# Patient Record
Sex: FEMALE | Race: OTHER | HISPANIC OR LATINO | URBAN - NONMETROPOLITAN AREA
[De-identification: names, ages, dates, MRNs, and addresses within clinical notes are randomized per-mention and may not be internally consistent; named-entity substitution may affect disease eponyms.]

---

## 2021-07-26 ENCOUNTER — APPOINTMENT (RX ONLY)
Dept: URBAN - NONMETROPOLITAN AREA CLINIC 25 | Facility: CLINIC | Age: 36
Setting detail: DERMATOLOGY
End: 2021-07-26

## 2021-07-26 VITALS — TEMPERATURE: 97.9 F | WEIGHT: 250 LBS | HEIGHT: 64 IN

## 2021-07-26 DIAGNOSIS — G51.0 BELL'S PALSY: ICD-10-CM | Status: INADEQUATELY CONTROLLED

## 2021-07-26 PROCEDURE — ? TREATMENT REGIMEN

## 2021-07-26 PROCEDURE — ? COUNSELING

## 2021-07-26 PROCEDURE — 99202 OFFICE O/P NEW SF 15 MIN: CPT

## 2021-07-26 ASSESSMENT — LOCATION DETAILED DESCRIPTION DERM
LOCATION DETAILED: LEFT MEDIAL SUPERIOR EYELID
LOCATION DETAILED: LEFT UPPER CUTANEOUS LIP

## 2021-07-26 ASSESSMENT — LOCATION SIMPLE DESCRIPTION DERM
LOCATION SIMPLE: LEFT LIP
LOCATION SIMPLE: LEFT SUPERIOR EYELID

## 2021-07-26 ASSESSMENT — LOCATION ZONE DERM
LOCATION ZONE: EYELID
LOCATION ZONE: LIP

## 2021-07-26 NOTE — PROCEDURE: TREATMENT REGIMEN
Plan: Advised pt Botox can be used help with Bell’s palsy. Advised pt both sides of the face may need to be treated to even the face out, pt verbalized understanding. Advised pt treatment will be switched to Kiara Dumont for further treatment for Botox, pt verbalized understanding.
Detail Level: Detailed

## 2021-07-27 ENCOUNTER — APPOINTMENT (RX ONLY)
Dept: URBAN - NONMETROPOLITAN AREA CLINIC 25 | Facility: CLINIC | Age: 36
Setting detail: DERMATOLOGY
End: 2021-07-27

## 2021-07-27 VITALS — TEMPERATURE: 97.2 F

## 2021-07-27 DIAGNOSIS — Z41.9 ENCOUNTER FOR PROCEDURE FOR PURPOSES OTHER THAN REMEDYING HEALTH STATE, UNSPECIFIED: ICD-10-CM

## 2021-07-27 PROCEDURE — ? BOTOX

## 2021-07-27 PROCEDURE — ? FILLERS

## 2021-07-27 NOTE — PROCEDURE: BOTOX
Show Ucl Units: No
Additional Area 2 Units: 1
Left Pupillary Line Units: 0
Show Forehead Units: Yes
Lot #: [id number]
Consent: Written consent obtained. Risks include but not limited to lid/brow ptosis, bruising, swelling, diplopia, temporary effect, incomplete chemical denervation.
Dilution (U/0.1 Cc): 4
Post-Care Instructions: Patient instructed to not lie down for 4 hours and limit physical activity for 24 hours.
Expiration Date (Month Year): 10/2023
Additional Area 2 Location: Right vermillion border
Periorbital Skin Units: 11
Price (Use Numbers Only, No Special Characters Or $): 130
Detail Level: Detailed

## 2021-07-27 NOTE — PROCEDURE: FILLERS
Marionette Lines Filler  Volume In Cc: 0
Post-Care Instructions: Patient instructed to apply ice to reduce swelling.
Lot #: [id number]
Anesthesia Volume In Cc: 0.5
Expiration Date (Month Year): 11-
Include Cannula Information In Note?: No
Additional Area 1 Location: Lips
Detail Level: Detailed
Filler: Restylane Kysse
Price (Use Numbers Only, No Special Characters Or $): 014
Map Statment: See Attach Map for Complete Details
Additional Comments: patient s given restylane wilder lot#[id number] exp 11-
Consent: Written consent obtained. Risks include but not limited to bruising, beading, irregular texture, ulceration, infection, allergic reaction, scar formation, incomplete augmentation, temporary nature, procedural pain.
Vermilion Lips Filler Volume In Cc: 1

## 2021-10-08 ENCOUNTER — APPOINTMENT (RX ONLY)
Dept: URBAN - NONMETROPOLITAN AREA CLINIC 25 | Facility: CLINIC | Age: 36
Setting detail: DERMATOLOGY
End: 2021-10-08

## 2021-10-08 DIAGNOSIS — Z41.9 ENCOUNTER FOR PROCEDURE FOR PURPOSES OTHER THAN REMEDYING HEALTH STATE, UNSPECIFIED: ICD-10-CM

## 2021-10-08 PROCEDURE — ? ADDITIONAL NOTES

## 2021-10-08 PROCEDURE — ? BOTOX

## 2021-10-08 NOTE — PROCEDURE: BOTOX
Additional Area 3 Location: Left periorbital
Post-Care Instructions: Patient instructed to not lie down for 4 hours and limit physical activity for 24 hours.
Show Additional Area 3: Yes
Dilution (U/0.1 Cc): 4
Left Periorbital Units: 0
Show Right And Left Periorbital Units: No
Additional Area 1 Location: Right vermilion border
Additional Area 5 Units: 2
Additional Area 3 Units: 12
Reconstitution Date (Optional): 10/1/2021
Additional Area 2 Location: Right periorbital
Expiration Date (Month Year): 11/2023
Additional Area 4 Location: Right forehead
Lot #: [id number]
Additional Area 5 Location: Left forehead
Consent: Written consent obtained. Risks include but not limited to lid/brow ptosis, bruising, swelling, diplopia, temporary effect, incomplete chemical denervation.
Additional Area 4 Units: 6
Detail Level: Detailed

## 2021-10-08 NOTE — PROCEDURE: ADDITIONAL NOTES
Detail Level: Simple
Render Risk Assessment In Note?: no
Additional Notes: Patient has [place] Palsy - see exact unit amounts and locations for Botox

## 2022-01-14 ENCOUNTER — APPOINTMENT (RX ONLY)
Dept: URBAN - NONMETROPOLITAN AREA CLINIC 25 | Facility: CLINIC | Age: 37
Setting detail: DERMATOLOGY
End: 2022-01-14

## 2022-01-14 DIAGNOSIS — Z41.9 ENCOUNTER FOR PROCEDURE FOR PURPOSES OTHER THAN REMEDYING HEALTH STATE, UNSPECIFIED: ICD-10-CM

## 2022-01-14 PROCEDURE — ? BOTOX

## 2022-01-14 PROCEDURE — ? FILLERS

## 2022-01-14 NOTE — PROCEDURE: BOTOX
Show Ucl Units: No
Consent: Written consent obtained. Risks include but not limited to lid/brow ptosis, bruising, swelling, diplopia, temporary effect, incomplete chemical denervation.
Glabellar Complex Units: 0
Show Orbicularis Oculi Units: Yes
Periorbital Skin Units: 20
Detail Level: Detailed
Post-Care Instructions: Patient instructed to not lie down for 4 hours and limit physical activity for 24 hours.
Lot #: [id number]
Forehead Units: 14
Expiration Date (Month Year): 10/23
Dilution (U/0.1 Cc): 4
Additional Area 1 Location: Depressor anguli oris

## 2022-01-14 NOTE — PROCEDURE: FILLERS
Additional Area 4 Volume In Cc: 0
Expiration Date (Month Year): 2/26/24
Anesthesia Volume In Cc: 0.5
Include Cannula Information In Note?: No
Expiration Date (Month Year): 1/31/23
Lot #: [id number]
Post-Care Instructions: Patient instructed to apply ice to reduce swelling.
Consent: Written consent obtained. Risks include but not limited to bruising, beading, irregular texture, ulceration, infection, allergic reaction, scar formation, incomplete augmentation, temporary nature, procedural pain.
Lot #: [id number]
Map Statment: See Attach Map for Complete Details
Filler: RHA 3
Detail Level: Detailed
Additional Anesthesia Volume In Cc: 6
Vermilion Lips Filler Volume In Cc: 1
Lot #: [id number]
Map Statment: See Attach Map for Complete Details
Expiration Date (Month Year): 11/2022
Lot #: [id number]
Filler: Restylane Kysse

## 2022-04-08 ENCOUNTER — APPOINTMENT (RX ONLY)
Dept: URBAN - NONMETROPOLITAN AREA CLINIC 25 | Facility: CLINIC | Age: 37
Setting detail: DERMATOLOGY
End: 2022-04-08

## 2022-04-08 DIAGNOSIS — Z41.9 ENCOUNTER FOR PROCEDURE FOR PURPOSES OTHER THAN REMEDYING HEALTH STATE, UNSPECIFIED: ICD-10-CM

## 2022-04-08 PROCEDURE — ? DYSPORT

## 2022-04-08 PROCEDURE — ? FILLERS

## 2022-04-08 PROCEDURE — ? BOTOX

## 2022-04-08 NOTE — PROCEDURE: DYSPORT
Left Periorbital Units: 0
Forehead Units: 15
Show Additional Area 4: Yes
Show Mentalis Units: No
Dilution (U/ 0.1cc): 10
Post-Care Instructions: Patient instructed to not lie down for 4 hours and limit physical activity for 24 hours.
Additional Area 1 Location: [place] border
Detail Level: Detailed
Periorbital Skin Units: 20
Consent: Written consent obtained. Risks include but not limited to lid/brow ptosis, bruising, swelling, diplopia, temporary effect, incomplete chemical denervation.
Lot #: [id number]
Expiration Date (Month Year): 8/31/2022

## 2022-04-08 NOTE — PROCEDURE: FILLERS
Use Map Statement For Sites (Optional): No
Dorsal Hands Filler  Volume In Cc: 0
Expiration Date (Month Year): 06/24
Additional Area 1 Location: Chin
Map Statment: See Attach Map for Complete Details
Filler: Restylane Kysse
Consent: Written consent obtained. Risks include but not limited to bruising, beading, irregular texture, ulceration, infection, allergic reaction, scar formation, incomplete augmentation, temporary nature, procedural pain.
Post-Care Instructions: Patient instructed to apply ice to reduce swelling.
Vermilion Lips Filler Volume In Cc: 1
Detail Level: Detailed
Lot #: [id number]
Anesthesia Volume In Cc: 0.5
Expiration Date (Month Year): 06/2023
Lot #: [id number]
Additional Anesthesia Volume In Cc: 6

## 2022-04-08 NOTE — PROCEDURE: BOTOX
Right Periorbital Units: 0
Show Additional Area 3: Yes
Show Right And Left Pupillary Line Units: No
Additional Area 2 Location: Right lower vermillion border
Consent: Written consent obtained. Risks include but not limited to lid/brow ptosis, bruising, swelling, diplopia, temporary effect, incomplete chemical denervation.
Lot #: [id number]
Expiration Date (Month Year): 10/23
Additional Area 2 Units: 2
Additional Area 1 Location: Right upper [place] border
Post-Care Instructions: Patient instructed to not lie down for 4 hours and limit physical activity for 24 hours.
Detail Level: Detailed
Dilution (U/0.1 Cc): 4
Additional Area 1 Units: 3
Additional Area 5 Location: [place]

## 2022-07-08 ENCOUNTER — APPOINTMENT (RX ONLY)
Dept: URBAN - METROPOLITAN AREA CLINIC 154 | Facility: CLINIC | Age: 37
Setting detail: DERMATOLOGY
End: 2022-07-08

## 2022-07-08 DIAGNOSIS — Z41.9 ENCOUNTER FOR PROCEDURE FOR PURPOSES OTHER THAN REMEDYING HEALTH STATE, UNSPECIFIED: ICD-10-CM

## 2022-07-08 PROCEDURE — ? FILLERS

## 2022-07-08 PROCEDURE — ? BOTOX

## 2022-07-08 NOTE — PROCEDURE: BOTOX
Show Additional Area 1: Yes
Show Right And Left Pupillary Line Units: No
Left Periorbital Units: 0
Expiration Date (Month Year): 5/24
Lot #: [id number]
Forehead Units: 5
Additional Area 1 Location: Right upper [place] border
Glabellar Complex Units: 7.5
Dilution (U/0.1 Cc): 4
Detail Level: Detailed
Additional Area 2 Location: Right lower vermillion border
Consent: Written consent obtained. Risks include but not limited to lid/brow ptosis, bruising, swelling, diplopia, temporary effect, incomplete chemical denervation.
Additional Area 5 Location: [place]
Post-Care Instructions: Patient instructed to not lie down for 4 hours and limit physical activity for 24 hours.
Periorbital Skin Units: 12.5

## 2022-07-08 NOTE — PROCEDURE: FILLERS
Vermilion Lips Filler Volume In Cc: 1
Nasolabial Folds Filler Volume In Cc: 0
Aspiration Statement: Aspiration was performed prior to injecting site with filler.
Include Cannula Information In Note?: No
Lot #: [id number]
Lot #: [id number]
Detail Level: Detailed
Lot #: [id number]
Expiration Date (Month Year): 10/31/2023
Anesthesia Volume In Cc: 0.5
Expiration Date (Month Year): 10/2024
Post-Care Instructions: Patient instructed to apply ice to reduce swelling.
Consent: Written consent obtained. Risks include but not limited to bruising, beading, irregular texture, ulceration, infection, allergic reaction, scar formation, incomplete augmentation, temporary nature, procedural pain.
Expiration Date (Month Year): 02/2023
Additional Anesthesia Volume In Cc: 6
Additional Area 1 Location: Chin
Map Statment: See Attach Map for Complete Details
Filler: Restylane Kysse

## 2022-10-07 ENCOUNTER — APPOINTMENT (RX ONLY)
Dept: URBAN - NONMETROPOLITAN AREA CLINIC 25 | Facility: CLINIC | Age: 37
Setting detail: DERMATOLOGY
End: 2022-10-07

## 2022-10-07 DIAGNOSIS — Z41.9 ENCOUNTER FOR PROCEDURE FOR PURPOSES OTHER THAN REMEDYING HEALTH STATE, UNSPECIFIED: ICD-10-CM

## 2022-10-07 PROCEDURE — ? FILLERS

## 2022-10-07 PROCEDURE — ? BOTOX

## 2022-10-07 NOTE — PROCEDURE: FILLERS
Tear Troughs Filler  Volume In Cc: 0
Additional Anesthesia Volume In Cc: 6
Additional Area 1 Location: Chin
Detail Level: Detailed
Lot #: [id number]
Filler: Restylane Kysse
Use Map Statement For Sites (Optional): No
Expiration Date (Month Year): 02/2023
Consent: Written consent obtained. Risks include but not limited to bruising, beading, irregular texture, ulceration, infection, allergic reaction, scar formation, incomplete augmentation, temporary nature, procedural pain.
Map Statment: See Attach Map for Complete Details
Post-Care Instructions: Patient instructed to apply ice to reduce swelling.
Lot #: [id number]
Aspiration Statement: Aspiration was performed prior to injecting site with filler.
Vermilion Lips Filler Volume In Cc: 1
Expiration Date (Month Year): 07/31/2023
Lot #: [id number]
Anesthesia Volume In Cc: 0.5
Expiration Date (Month Year): 10/2024

## 2022-10-07 NOTE — PROCEDURE: BOTOX
Additional Area 3 Units: 0
Show Forehead Units: Yes
Show Right And Left Pupillary Line Units: No
Patient Specific Comments (Will Not Stick From Patient To Patient): Injected across frontalis, more units used to left orbicularis oris than right
Expiration Date (Month Year): 5/24
Additional Area 5 Location: [place]
Consent: Written consent obtained. Risks include but not limited to lid/brow ptosis, bruising, swelling, diplopia, temporary effect, incomplete chemical denervation.
Forehead Units: 5
Additional Area 1 Location: Right upper vermillion
Post-Care Instructions: Patient instructed to not lie down for 4 hours and limit physical activity for 24 hours.
Inferior Lateral Orbicularis Oculi Units: 10
Lot #: [id number]
Detail Level: Detailed
Additional Area 2 Location: Right lower vermillion border
Dilution (U/0.1 Cc): 4

## 2022-10-20 ENCOUNTER — APPOINTMENT (RX ONLY)
Dept: URBAN - METROPOLITAN AREA CLINIC 154 | Facility: CLINIC | Age: 37
Setting detail: DERMATOLOGY
End: 2022-10-20

## 2022-10-20 DIAGNOSIS — Z41.9 ENCOUNTER FOR PROCEDURE FOR PURPOSES OTHER THAN REMEDYING HEALTH STATE, UNSPECIFIED: ICD-10-CM

## 2022-10-20 PROCEDURE — ? COSMETIC CONSULTATION: SKIN CARE PRODUCTS AND SERVICES

## 2023-01-04 ENCOUNTER — APPOINTMENT (RX ONLY)
Dept: URBAN - NONMETROPOLITAN AREA CLINIC 25 | Facility: CLINIC | Age: 38
Setting detail: DERMATOLOGY
End: 2023-01-04

## 2023-01-04 DIAGNOSIS — Z41.9 ENCOUNTER FOR PROCEDURE FOR PURPOSES OTHER THAN REMEDYING HEALTH STATE, UNSPECIFIED: ICD-10-CM

## 2023-01-04 PROCEDURE — ? JEUVEAU

## 2023-01-04 NOTE — PROCEDURE: JEUVEAU
Right Pupillary Line Units: 0
Show Lateral Platysmal Band Units: Yes
Additional Area 1 Units: 5
Additional Area 2 Location: [place]
Show Ucl Units: No
Lot #: [id number]
Detail Level: Detailed
Periorbital Skin Units: 15
Dilution (U/0.1 Cc): 4
Expiration Date (Month Year): 11/2024
Consent: Written consent obtained. Risks include but not limited to lid/brow ptosis, bruising, swelling, diplopia, temporary effect, incomplete chemical denervation.
Post-Care Instructions: Patient instructed to not lie down for 4 hours and limit physical activity for 24 hours.
Additional Area 1 Location: vermillion Border

## 2023-02-20 ENCOUNTER — APPOINTMENT (RX ONLY)
Dept: URBAN - METROPOLITAN AREA CLINIC 154 | Facility: CLINIC | Age: 38
Setting detail: DERMATOLOGY
End: 2023-02-20

## 2023-02-20 DIAGNOSIS — Z41.9 ENCOUNTER FOR PROCEDURE FOR PURPOSES OTHER THAN REMEDYING HEALTH STATE, UNSPECIFIED: ICD-10-CM

## 2023-02-20 PROCEDURE — ? ADDITIONAL NOTES

## 2023-02-20 PROCEDURE — ? DAXXIFY

## 2023-02-20 PROCEDURE — ? DYSPORT

## 2023-02-20 PROCEDURE — ? FILLERS

## 2023-02-20 NOTE — PROCEDURE: DAXXIFY
Show Lcl Units: No
R Brow Units: 0
Show Additional Area 4: Yes
Detail Level: Detailed
Additional Area 1 Units: 10
Dilution (U/0.1 Cc): 4
Consent: Written consent obtained. Risks include but not limited to lid/brow ptosis, bruising, swelling, diplopia, temporary effect, incomplete chemical denervation.
Post-Care Instructions: Patient instructed to not lie down for 4 hours and limit physical activity for 24 hours.
Additional Area 1 Location: [place] border to right border

## 2023-02-20 NOTE — PROCEDURE: DYSPORT
Show Forehead Units: Yes
Lcl Root Units: 0
Additional Area 1 Location: [place] border
Forehead Units: 7.5
Glabellar Complex Units: 15
Detail Level: Detailed
Consent: Written consent obtained. Risks include but not limited to lid/brow ptosis, bruising, swelling, diplopia, temporary effect, incomplete chemical denervation.
Show Mentalis Units: No
Additional Area 2 Location: chin
Dilution (U/ 0.1cc): 10
Post-Care Instructions: Patient instructed to not lie down for 4 hours and limit physical activity for 24 hours.
Periorbital Skin Units: 12.5
Lot #: [id number]
Expiration Date (Month Year): 01/2023

## 2023-02-20 NOTE — PROCEDURE: FILLERS
Additional Area 2 Volume In Cc: 0
Include Cannula Information In Note?: No
Consent: Written consent obtained. Risks include but not limited to bruising, beading, irregular texture, ulceration, infection, allergic reaction, scar formation, incomplete augmentation, temporary nature, procedural pain.
Filler: Restylane Kysse
Vermilion Lips Filler Volume In Cc: 1
Aspiration Statement: Aspiration was performed prior to injecting site with filler.
Lot #: [id number]
Expiration Date (Month Year): 02/2024
Detail Level: Detailed
Lot #: [id number]
Additional Area 1 Location: Chin
Anesthesia Volume In Cc: 0.5
Expiration Date (Month Year): 10/2024
Additional Anesthesia Volume In Cc: 6
Map Statment: See Attach Map for Complete Details
Lot #: [id number]
Post-Care Instructions: Patient instructed to apply ice to reduce swelling.
Expiration Date (Month Year): 02/2023

## 2023-02-20 NOTE — PROCEDURE: ADDITIONAL NOTES
Render Risk Assessment In Note?: no
Detail Level: Simple
Additional Notes: Only left crow’s feet injected

## 2023-07-03 ENCOUNTER — APPOINTMENT (RX ONLY)
Dept: URBAN - METROPOLITAN AREA CLINIC 154 | Facility: CLINIC | Age: 38
Setting detail: DERMATOLOGY
End: 2023-07-03

## 2023-07-03 DIAGNOSIS — Z41.9 ENCOUNTER FOR PROCEDURE FOR PURPOSES OTHER THAN REMEDYING HEALTH STATE, UNSPECIFIED: ICD-10-CM

## 2023-07-03 PROCEDURE — ? DYSPORT

## 2023-07-03 PROCEDURE — ? DAXXIFY

## 2023-10-09 ENCOUNTER — APPOINTMENT (RX ONLY)
Dept: URBAN - METROPOLITAN AREA CLINIC 154 | Facility: CLINIC | Age: 38
Setting detail: DERMATOLOGY
End: 2023-10-09

## 2023-10-09 DIAGNOSIS — Z41.9 ENCOUNTER FOR PROCEDURE FOR PURPOSES OTHER THAN REMEDYING HEALTH STATE, UNSPECIFIED: ICD-10-CM

## 2023-10-09 PROCEDURE — ? DYSPORT

## 2023-10-09 PROCEDURE — ? FILLERS

## 2023-10-09 PROCEDURE — ? DAXXIFY

## 2023-10-09 NOTE — PROCEDURE: FILLERS
Anesthesia Volume In Cc: 0.5
Mid Face Filler  Volume In Cc: 0
Post-Care Instructions: Patient instructed to apply ice to reduce swelling.
Expiration Date (Month Year): 02/2024
Detail Level: Detailed
Include Cannula Information In Note?: No
Additional Anesthesia Volume In Cc: 6
Map Statment: See Attach Map for Complete Details
Additional Area 1 Location: Chin
Lot #: [id number]
Filler: Restylane Kysse
Expiration Date (Month Year): 10/2024
Lot #: [id number]
Aspiration Statement: Aspiration was performed prior to injecting site with filler.
Expiration Date (Month Year): 02/2023
Vermilion Lips Filler Volume In Cc: 1
Consent: Written consent obtained. Risks include but not limited to bruising, beading, irregular texture, ulceration, infection, allergic reaction, scar formation, incomplete augmentation, temporary nature, procedural pain.
Lot #: [id number]

## 2023-10-09 NOTE — PROCEDURE: DAXXIFY
Show Depressor Anguli Units: Yes
Show Mentalis Units: No
[facility] Units: 0
Consent: Written consent obtained. Risks include but not limited to lid/brow ptosis, bruising, swelling, diplopia, temporary effect, incomplete chemical denervation.
Additional Area 1 Location: [place] border to right border
Detail Level: Detailed
Post-Care Instructions: Patient instructed to not lie down for 4 hours and limit physical activity for 24 hours.
Additional Area 1 Units: 10
Dilution (U/0.1 Cc): 4

## 2023-10-09 NOTE — PROCEDURE: DYSPORT
Additional Area 6 Units: 0
Show Periorbital Units: Yes
Lot #: [id number]
Forehead Units: 10
Additional Area 1 Location: [facility]
Glabellar Complex Units: 15
Show Right And Left Periorbital Units: No
Detail Level: Detailed
Additional Area 2 Location: chin
Consent: Written consent obtained. Risks include but not limited to lid/brow ptosis, bruising, swelling, diplopia, temporary effect, incomplete chemical denervation.
Expiration Date (Month Year): 01/2023
Post-Care Instructions: Patient instructed to not lie down for 4 hours and limit physical activity for 24 hours.

## 2024-02-12 ENCOUNTER — RX ONLY (OUTPATIENT)
Age: 39
Setting detail: RX ONLY
End: 2024-02-12

## 2024-02-12 ENCOUNTER — APPOINTMENT (RX ONLY)
Dept: URBAN - METROPOLITAN AREA CLINIC 154 | Facility: CLINIC | Age: 39
Setting detail: DERMATOLOGY
End: 2024-02-12

## 2024-02-12 DIAGNOSIS — Z41.9 ENCOUNTER FOR PROCEDURE FOR PURPOSES OTHER THAN REMEDYING HEALTH STATE, UNSPECIFIED: ICD-10-CM

## 2024-02-12 PROCEDURE — ? DYSPORT

## 2024-02-12 PROCEDURE — ? DAXXIFY

## 2024-02-12 RX ORDER — PHARMACY COMPOUNDING ACCESSORY
EACH MISCELLANEOUS
Qty: 30 | Refills: 5 | Status: ERX | COMMUNITY
Start: 2024-02-12

## 2024-02-12 NOTE — PROCEDURE: DAXXIFY
Left Pupillary Line Units: 0
Dilution (U/0.1 Cc): 4
Bill Summary Price Listed Below, Or Bill Total Of Units X Price Per Unit?: Bill Summary Price Below
Additional Area 1 Location: [place] border to right border
Detail Level: Detailed
Additional Area 1 Units: 10
Show Topical Anesthesia: Yes
Show Mentalis Units: No
Post-Care Instructions: Patient instructed to not lie down for 4 hours and limit physical activity for 24 hours.
Consent: Written consent obtained. Risks include but not limited to lid/brow ptosis, bruising, swelling, diplopia, temporary effect, incomplete chemical denervation.

## 2024-02-12 NOTE — PROCEDURE: DYSPORT
Periorbital Skin Units: 15
Additional Area 1 Location: [facility] [place]
Lot #: [id number]
Right Periorbital Skin Units: 0
Consent: Written consent obtained. Risks include but not limited to lid/brow ptosis, bruising, swelling, diplopia, temporary effect, incomplete chemical denervation.
Show Depressor Anguli Units: Yes
Show Mentalis Units: No
Dilution (U/0.1 Cc): 10
Post-Care Instructions: Patient instructed to not lie down for 4 hours and limit physical activity for 24 hours.
Additional Area 2 Location: chin
Expiration Date (Month Year): 01/2023
Detail Level: Detailed

## 2024-06-03 ENCOUNTER — APPOINTMENT (RX ONLY)
Dept: URBAN - METROPOLITAN AREA CLINIC 154 | Facility: CLINIC | Age: 39
Setting detail: DERMATOLOGY
End: 2024-06-03

## 2024-06-03 DIAGNOSIS — Z41.9 ENCOUNTER FOR PROCEDURE FOR PURPOSES OTHER THAN REMEDYING HEALTH STATE, UNSPECIFIED: ICD-10-CM

## 2024-06-03 PROCEDURE — ? DAXXIFY

## 2024-06-03 PROCEDURE — ? DYSPORT

## 2024-06-03 NOTE — PROCEDURE: DYSPORT
Show Right And Left Pupillary Line Units: No
R Brow Units: 0
Show Periorbital Units: Yes
Detail Level: Detailed
Expiration Date (Month Year): 01/2026
Glabellar Complex Units: 15
Additional Area 1 Location: chin
Lot #: [id number]
Periorbital Skin Units: 20
Consent: Written consent obtained. Risks include but not limited to lid/brow ptosis, bruising, swelling, diplopia, temporary effect, incomplete chemical denervation.
Additional Area 1 Units: 5
Dilution (U/0.1 Cc): 10
Post-Care Instructions: Patient instructed to not lie down for 4 hours and limit physical activity for 24 hours.

## 2024-06-03 NOTE — PROCEDURE: DAXXIFY
Show Glabellar Units: Yes
Show Right And Left Brow Units: No
Bill Summary Price Listed Below, Or Bill Total Of Units X Price Per Unit?: Bill Summary Price Below
Lateral Platysmal Bands Units: 0
Dilution (U/0.1 Cc): 4
Detail Level: Detailed
Post-Care Instructions: Patient instructed to not lie down for 4 hours and limit physical activity for 24 hours.
Additional Area 1 Location: [place] border
Additional Area 1 Units: 10
Consent: Written consent obtained. Risks include but not limited to lid/brow ptosis, bruising, swelling, diplopia, temporary effect, incomplete chemical denervation.

## 2024-10-01 NOTE — PROCEDURE: DYSPORT
Show Forehead Units: Yes
Lcl Root Units: 0
Additional Area 1 Location: [place] border
Forehead Units: 7.5
Glabellar Complex Units: 15
Detail Level: Detailed
Consent: Written consent obtained. Risks include but not limited to lid/brow ptosis, bruising, swelling, diplopia, temporary effect, incomplete chemical denervation.
Show Mentalis Units: No
Additional Area 2 Location: chin
Dilution (U/ 0.1cc): 10
Post-Care Instructions: Patient instructed to not lie down for 4 hours and limit physical activity for 24 hours.
Periorbital Skin Units: 12.5
Lot #: [id number]
Expiration Date (Month Year): 01/2023
(E4) spontaneous

## 2024-12-23 ENCOUNTER — APPOINTMENT (OUTPATIENT)
Dept: URBAN - METROPOLITAN AREA CLINIC 154 | Facility: CLINIC | Age: 39
Setting detail: DERMATOLOGY
End: 2024-12-23

## 2024-12-23 DIAGNOSIS — Z41.9 ENCOUNTER FOR PROCEDURE FOR PURPOSES OTHER THAN REMEDYING HEALTH STATE, UNSPECIFIED: ICD-10-CM

## 2024-12-23 PROCEDURE — ? DYSPORT

## 2024-12-23 PROCEDURE — ? DAXXIFY

## 2024-12-23 NOTE — PROCEDURE: DYSPORT
Additional Area 6 Units: 0
Show Right And Left Periorbital Units: No
Show Nasal Units: Yes
Lot #: [id number]
Forehead Units: 10
Additional Area 1 Location: [facility]
Glabellar Complex Units: 20
Consent: Written consent obtained. Risks include but not limited to lid/brow ptosis, bruising, swelling, diplopia, temporary effect, incomplete chemical denervation.
Detail Level: Detailed
Post-Care Instructions: Patient instructed to not lie down for 4 hours and limit physical activity for 24 hours.
Additional Area 2 Location: chin
Expiration Date (Month Year): 01/2026

## 2024-12-23 NOTE — PROCEDURE: DAXXIFY
Mentalis Units: 0
Show Additional Area 1: Yes
Show Lcl Units: No
Additional Area 1 Location: [place] border
Detail Level: Detailed
Consent: Written consent obtained. Risks include but not limited to lid/brow ptosis, bruising, swelling, diplopia, temporary effect, incomplete chemical denervation.
Additional Area 1 Units: 10
Bill Summary Price Listed Below, Or Bill Total Of Units X Price Per Unit?: Bill Summary Price Below
Dilution (U/0.1 Cc): 4
Post-Care Instructions: Patient instructed to not lie down for 4 hours and limit physical activity for 24 hours.